# Patient Record
(demographics unavailable — no encounter records)

---

## 2025-06-26 NOTE — DISCUSSION/SUMMARY
[de-identified] : Pt has 2 issues: 1. Lumbar radiculopathy  We discussed their diagnosis and treatment options at length including the risks and benefits of both surgical and non-surgical options..  - We will continue conservative treatment with a course of PT and anti-inflammatory medication.  - Rx given for Medrol dose pack  - Discussed the possible side effects of medication along with the timing and frequency for taking.  - If they do not make an appropriate improvement with therapy we discussed that they will follow up with Dr. Morrison  2. Right knee arthritis   - We discussed their diagnosis and treatment options at length including the risks and benefits of both surgical treatment with a knee replacement and non-surgical options.  - We will continue conservative treatment with activity modification, PT, icing, weight loss, and anti-inflammatory medications.  - The patient was provided with a PT prescription to work on ROM, hip ER/abductors strengthening, quad/hamstring stretches and strengthening, and other exercises.  - The patient was advised to let pain guide the gradual advancement of activities.  - We discussed that they will follow up with Dr. Grimm

## 2025-06-26 NOTE — IMAGING
[de-identified] : RIGHT KNEE  Inspection:  mild effusion  Palpation: medial joint line tenderness, anterior tenderness  Knee Range of Motion:  3-125   Strength: 5/5 Quadriceps strength, 5/5 Hamstring strength  Neurological: light touch is intact throughout  Ligament Stability and Special Tests:   McMurrays: neg  Lachman: neg  Pivot Shift: neg  Posterior Drawer: neg  Valgus: neg  Varus: neg  Patella Apprehension: neg  Patella Maltracking: neg  RIGHT KNEE  Inspection:  no effusion  Palpation: no tenderness  Knee Range of Motion:  0-140    Strength: 5/5 Quadriceps strength, 5/5 Hamstring strength  Neurological: light touch is intact throughout  Ligament Stability and Special Tests:   Able to SLR  McMurrays: neg  Lachman: neg  Pivot Shift: neg  Posterior Drawer: neg  Valgus: neg  Varus: neg  Patella Apprehension: neg  Patella Maltracking: neg  Gait: non-antalgic

## 2025-06-26 NOTE — DATA REVIEWED
[FreeTextEntry1] : Right X-Ray Examination of the KNEE (4 views): medial and patellofemoral degenerate changes.   Right hip 3 v  neg for fx or dislocation mod oa noted

## 2025-06-26 NOTE — HISTORY OF PRESENT ILLNESS
[Retired] : Work status: retired [de-identified] :  Date of Injury/Onset: from years ago  Pain:  At Rest:  yes  Pain With Activity:  yes  Mechanism of injury: This is NOT a Work Related Injury being treated under Worker's Compensation. This is NOT an athletic injury occurring associated with an interscholastic or organized sports team. Quality of symptoms: sharp, dull and aching  Improves with: denies  Worse with: all movements  Prior treatment: denies  Prior Imaging: denies  Reports Available For Review Today: Out of work/sport: retired  School/Sport/Position/Occupation: retired  Personal goal: Additional Information:   06/26/2025 : HEATH is here today for bilateral knee and right hip pain, reports he did have injury   [FreeTextEntry1] : B/L KNEE, RT HIP

## 2025-07-03 NOTE — REASON FOR VISIT
[FreeTextEntry1] : PCP/Referring Doctor: Everett Lancaster NP Chief Complaint: "Regular Check Up" ------------------------------------------------------------------------------------------------------------------------------------ History of Present Illness: 83-year-old with past medical history of hypertension hyperlipidemia presents for evaluation of new onset chest pain Chest pain is substernal associated with dyspnea on exertion and is relieved with rest Patient states that his symptoms are also triggered by emotional stress and is more point Currently having some housing issues with his son..  No ROS:  chest pain (-), dyspnea with exertion (+), orthopnea (+), edema (-), palpitations (-), dizziness (+) All other systems were reviewed and are negative. ------------------------------------------------------------------------------------------------------------------------------------ Past Medical History: Any history of HTN? Yes Any history of Lipid disorders? TG  LDL  Any history Diabetes or Prediabetes? No HbA1c  Any history of MI, stroke or TIA? No Any prior Stress Testing? Yes 10+ Years  Any prior Cath/Angiogram procedure? No Any prior Echocardiogram (TTE)? No Any prior vascular study? No Have patient been on blood thinners? No Have patient had cardiac or vascular surgeries? No ------------------------------------------------------------------------------------------------------------------------------------ Patient's current cardiovascular medications were reviewed and updated in chart. Yes ------------------------------------------------------------------------------------------------------------------------------------ Family history Do you have a family history of heart attacks and/or stroke before the age of 60? No Do you have a family history of cardiac arrest? No ------------------------------------------------------------------------------------------------------------------------------------ Social History: Do you currently or previously used tobacco including cigarettes and vapes? No How many alcoholic drinks per week? 0 What is your occupation? Retired   ------------------------------------------------------------------------------------------------------------------------------------ Physical Exam: General appearance: NAD Lungs: CTAB CV: RRR Extremities: No peripheral edema.

## 2025-07-18 NOTE — DISCUSSION/SUMMARY
[FreeTextEntry1] : 83 year old patient presents for evaluation of  asthma  has had since childhood   He has history of  seasonal allergy  he uses albuterol   and Flovent    Has not had recent asthma exacerbation  He states that he has no significant dyspnea  PFT demonstrated  diminished flow at level of small airways, mild   PLAN  continue on ICS ie. Flovent 250  Use albuterol 2 p BID  He will inform me if his status changes  He will obtain CXR  Ordered blood testing for allergy and  hypersensitivity testing  follow up with me and with cardiology  Further recommendations based on results.

## 2025-07-18 NOTE — PROCEDURE
[FreeTextEntry1] : PFT     normal FEV1,  FVC and ratio with diminished flow at level of small airways normal dffusion  Test needed to adequately evaluate and manage the condition   Wilbert Shelton MD [facility]

## 2025-07-18 NOTE — HISTORY OF PRESENT ILLNESS
[Never] : never [TextBox_4] : 83 year old patient presents for evaluation of  asthma  has had since childhood   He has history of  seasonal allergy  he uses albuterol   and Flovent    Has not had recent asthma exacerbation   Primary doctor is  Dr Jose J Silva     PSH:    hernia     PMH:    hypertension hyperlipidemia  asthma         SH:   never smoker     ETOH:  no     Occupation:  Retired      ALLERGY:   NKDA   allergic to   Reaction is   environmental/seasonal allergy:  dust       Review of Systems:   No rash, skin problems  no sinusitis, sinus infections, nasal obstruction no dysphagia no dry mouth     no pneumonia  no lung cancer   no CAD no MI no chest pain no murmur no CHF  no edema   no peptic ulcer or gastritis no GERD no abdominal pain no liver disease   no Diabetes no thyroid disease no hyperlipidemia     no bleeding   no DVT or PE   no kidney disease   no stroke no seizure

## 2025-07-18 NOTE — PHYSICAL EXAM
[No Acute Distress] : no acute distress [Normal Oropharynx] : normal oropharynx [Normal Appearance] : normal appearance [No Neck Mass] : no neck mass [Normal Rate/Rhythm] : normal rate/rhythm [Normal S1, S2] : normal s1, s2 [No Murmurs] : no murmurs [No Resp Distress] : no resp distress [Clear to Auscultation Bilaterally] : clear to auscultation bilaterally [No Abnormalities] : no abnormalities [Benign] : benign [Normal Gait] : normal gait [No Cyanosis] : no cyanosis [FROM] : FROM [1+ Pitting] : 1+ pitting [Normal Color/ Pigmentation] : normal color/ pigmentation [No Focal Deficits] : no focal deficits [Oriented x3] : oriented x3 [Normal Affect] : normal affect [TextBox_105] : knee deformity

## 2025-07-21 NOTE — IMAGING
[de-identified] : Constitutional: well developed and well nourished, able to communicate Cardiovascular: Peripheral vascular exam is grossly normal Neurologic: Alert and oriented, no acute distress. Skin: normal skin with no ulcers, rashes, or lesions Pulmonary: No respiratory distress, breathing comfortably on room air Lymphatics: No obvious lymphadenopathy or lymphedema in areas examined  RIGHT KNEE EXAM Alignment: Neutral  Effusion: None Atrophy: None                                                  Stable to Varus/valgus stress Posterior Drawer Test: negative Anterior Drawer Test: Negative Knee Extension/Flexion: 0 / 120  Medial/lateral compartments Medial joint line: POS Tenderness Lateral joint line: No Tenderness David test: negative  Patellofemoral joint Medial patellar facet: no tenderness Patellar grind: Negative  Tendons: Pes Anserine: No tenderness Gerdys Tubercle/ IT Band: No tenderness Quadriceps Tendon: No Tenderness patellar tendon: no Tenderness Tibial tubercle: not tenderness Calf: no Tenderness  Neurovascular exam Muscle strength: 5/5 Sensation to light touch: intact Distal pulses: 2+  [id number] Xrays of the Right Knee were taken demonstrating tricompartmental arthritis with joint space collapse, osteophytes, and sclerosis

## 2025-07-21 NOTE — ASSESSMENT
[FreeTextEntry1] : 83 year M with right knee OA, severe, prior gel injections  w/o relief CSI of the RIght knee 3 month PRN consideration for right TKA in the future  Time Based billin minutes was spent with the patient today taking the patient's history, conducting a physical examination, reviewing imaging studies, and  detailed discussion regarding the diagnosis and treatment plan.

## 2025-07-21 NOTE — HISTORY OF PRESENT ILLNESS
[de-identified] : 07/21/2025 HEATH he is here today for evaluation of b/l knees & hips pain x over 10 years ago, denies MIGUELITO. He is ambulating with a cane. Patient reports some numbness, tingling, stiffness, soreness pain. Pain radiates to b/l legs. Patient notes pain is worse sleeping, sitting, stairs, walking. Patient saw dr. Cervantes 06/26, had b/l Kness & hips x-rays done, had been taking MDP, & Mobic, had not been doing PT yet.
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
## 2025-07-24 NOTE — ASSESSMENT
[FreeTextEntry1] : PT, meds  Will obtain MRI to rule out HNP or prior fracture given injury, will also be used to guide potential future injections/surgical management.  NSAIDs- Patient warned of risk of medication to GI tract, increased blood pressure, cardiac risk, and risk of fluid retention.  Advised to clear medication with internist or PCP if any concurrent health problem with heart, blood pressure, or GI system exists.  Gabapentin- Patient advised of sedating effects, instructed not to drive, operate machinery, or take with other sedating medications. Advised of need to taper on/off medication and risk of abruptly stopping gabapentin.

## 2025-07-24 NOTE — IMAGING
[AP] : anteroposterior [de-identified] : LSPINE Inspection: No rash or ecchymosis Palpation: No tenderness to palpation or spasm in bilateral thoracic and lumbar paraspinal musculature, no SI joint tenderness to palpation ROM: Full with no pain Strength: 5/5 bilateral hip flexors, knee extensors, ankle dorsiflexors, EHL, ankle plantarflexors Sensation: Sensation present to light touch bilateral L2-S1 distributions Provocative maneuvers: Negative bilateral straight leg raise  Bilateral hips- Palpation: No tenderness to palpation over greater trochanter or IT band ROM: No pain with flexion and internal rotation  [Facet arthropathy] : Facet arthropathy [Disc space narrowing] : Disc space narrowing [Spondylolithesis] : Spondylolithesis [Moderate arthritis (Tonnis Grade 2)] : Moderate arthritis (Tonnis Grade 2) [FreeTextEntry1] : DDD, spondy L3-4-5, at least 20 deg scoliosis